# Patient Record
Sex: FEMALE | Race: WHITE | NOT HISPANIC OR LATINO
[De-identification: names, ages, dates, MRNs, and addresses within clinical notes are randomized per-mention and may not be internally consistent; named-entity substitution may affect disease eponyms.]

---

## 2017-03-23 ENCOUNTER — APPOINTMENT (OUTPATIENT)
Dept: SURGICAL ONCOLOGY | Facility: CLINIC | Age: 77
End: 2017-03-23

## 2017-05-11 ENCOUNTER — APPOINTMENT (OUTPATIENT)
Dept: SURGICAL ONCOLOGY | Facility: CLINIC | Age: 77
End: 2017-05-11

## 2017-05-11 VITALS
WEIGHT: 140 LBS | HEART RATE: 71 BPM | HEIGHT: 66 IN | BODY MASS INDEX: 22.5 KG/M2 | DIASTOLIC BLOOD PRESSURE: 98 MMHG | SYSTOLIC BLOOD PRESSURE: 195 MMHG | OXYGEN SATURATION: 98 %

## 2017-05-11 DIAGNOSIS — C43.62 MALIGNANT MELANOMA OF LEFT UPPER LIMB, INCLUDING SHOULDER: ICD-10-CM

## 2017-08-24 ENCOUNTER — OUTPATIENT (OUTPATIENT)
Dept: OUTPATIENT SERVICES | Facility: HOSPITAL | Age: 77
LOS: 1 days | Discharge: HOME | End: 2017-08-24

## 2017-08-24 DIAGNOSIS — C50.919 MALIGNANT NEOPLASM OF UNSPECIFIED SITE OF UNSPECIFIED FEMALE BREAST: ICD-10-CM

## 2017-08-30 ENCOUNTER — APPOINTMENT (OUTPATIENT)
Dept: HEMATOLOGY ONCOLOGY | Facility: CLINIC | Age: 77
End: 2017-08-30
Payer: MEDICARE

## 2017-08-30 VITALS
RESPIRATION RATE: 16 BRPM | HEART RATE: 63 BPM | SYSTOLIC BLOOD PRESSURE: 145 MMHG | WEIGHT: 142.64 LBS | TEMPERATURE: 98.6 F | OXYGEN SATURATION: 99 % | DIASTOLIC BLOOD PRESSURE: 77 MMHG | BODY MASS INDEX: 23.02 KG/M2

## 2017-08-30 PROCEDURE — 99214 OFFICE O/P EST MOD 30 MIN: CPT

## 2017-08-31 ENCOUNTER — APPOINTMENT (OUTPATIENT)
Dept: SURGICAL ONCOLOGY | Facility: CLINIC | Age: 77
End: 2017-08-31
Payer: MEDICARE

## 2017-08-31 VITALS
BODY MASS INDEX: 22.5 KG/M2 | HEART RATE: 62 BPM | WEIGHT: 140 LBS | HEIGHT: 66 IN | DIASTOLIC BLOOD PRESSURE: 66 MMHG | SYSTOLIC BLOOD PRESSURE: 110 MMHG | OXYGEN SATURATION: 98 % | TEMPERATURE: 97.7 F | RESPIRATION RATE: 16 BRPM

## 2017-08-31 DIAGNOSIS — Z85.820 PERSONAL HISTORY OF MALIGNANT MELANOMA OF SKIN: ICD-10-CM

## 2017-08-31 PROCEDURE — 99214 OFFICE O/P EST MOD 30 MIN: CPT

## 2017-12-28 ENCOUNTER — APPOINTMENT (OUTPATIENT)
Dept: SURGICAL ONCOLOGY | Facility: CLINIC | Age: 77
End: 2017-12-28

## 2019-03-12 ENCOUNTER — OUTPATIENT (OUTPATIENT)
Dept: OUTPATIENT SERVICES | Facility: HOSPITAL | Age: 79
LOS: 1 days | Discharge: HOME | End: 2019-03-12

## 2019-03-12 DIAGNOSIS — C50.919 MALIGNANT NEOPLASM OF UNSPECIFIED SITE OF UNSPECIFIED FEMALE BREAST: ICD-10-CM

## 2019-03-20 ENCOUNTER — APPOINTMENT (OUTPATIENT)
Dept: HEMATOLOGY ONCOLOGY | Facility: CLINIC | Age: 79
End: 2019-03-20
Payer: MEDICARE

## 2019-03-20 VITALS
RESPIRATION RATE: 18 BRPM | HEART RATE: 73 BPM | SYSTOLIC BLOOD PRESSURE: 158 MMHG | BODY MASS INDEX: 22.76 KG/M2 | TEMPERATURE: 97.8 F | OXYGEN SATURATION: 99 % | DIASTOLIC BLOOD PRESSURE: 87 MMHG | WEIGHT: 140.96 LBS

## 2019-03-20 PROCEDURE — 99214 OFFICE O/P EST MOD 30 MIN: CPT

## 2019-03-22 NOTE — PHYSICAL EXAM
[Restricted in physically strenuous activity but ambulatory and able to carry out work of a light or sedentary nature] : Status 1- Restricted in physically strenuous activity but ambulatory and able to carry out work of a light or sedentary nature, e.g., light house work, office work [Normal] : affect appropriate [de-identified] : right mastectomy and left breast implant, no palpable masses, tenderness in left breast

## 2019-03-22 NOTE — REVIEW OF SYSTEMS
[Joint Pain] : joint pain [Joint Stiffness] : joint stiffness [Negative] : Allergic/Immunologic [FreeTextEntry6] : saw Pulmonary and dx with asthma on inhaler with relief [FreeTextEntry8] : UTI 1 week and took antibiotic [FreeTextEntry9] : had xrays on back and neg  except arthritis

## 2019-03-22 NOTE — REASON FOR VISIT
[Follow-Up Visit] : a follow-up [Family Member] : family member [FreeTextEntry2] : ca breast x 23 years

## 2019-03-22 NOTE — ASSESSMENT
[FreeTextEntry1] : Pt to be further evaluated for left breast pain and undergo consult with Surgery and Plastic Surgery re left breast implant present for 23 yrs and post breast biopsy pain. Pt had rash after last MRI and reluctant to repeat. Mammo and US have been neg. Pt had neg biopsy. Discussed panel genetic testing . [Curative] : Goals of care discussed with patient: Curative [Palliative Care Plan] : not applicable at this time

## 2019-03-22 NOTE — HISTORY OF PRESENT ILLNESS
[de-identified] : This is a 76-year-old woman with history of breast carcinoma. The patient's history dates back to  when she was found to have a mass in the right breast. 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 Patient received chemotherapy with IV CMF and tolerated her treatment well. Since she was ER negative and she did not require hormonal therapy. The patient also underwent contralateral implant has been followed with mammograms with no evidence of recurrent disease. Of note is a family history of lymphoma or other, hematocrit breast cancer and a sister who  with pancreatic cancer. [de-identified] : since the last visit the pt remains well but had breast bx which was neg. Pt had mammo and sono and both neg. Pt has left breast pain.

## 2019-04-07 ENCOUNTER — TRANSCRIPTION ENCOUNTER (OUTPATIENT)
Age: 79
End: 2019-04-07

## 2019-04-23 ENCOUNTER — APPOINTMENT (OUTPATIENT)
Dept: SURGICAL ONCOLOGY | Facility: CLINIC | Age: 79
End: 2019-04-23
Payer: MEDICARE

## 2019-04-23 DIAGNOSIS — C50.919 MALIGNANT NEOPLASM OF UNSPECIFIED SITE OF UNSPECIFIED FEMALE BREAST: ICD-10-CM

## 2019-04-23 DIAGNOSIS — N64.4 MASTODYNIA: ICD-10-CM

## 2019-04-23 DIAGNOSIS — Z86.79 PERSONAL HISTORY OF OTHER DISEASES OF THE CIRCULATORY SYSTEM: ICD-10-CM

## 2019-04-23 DIAGNOSIS — Z87.09 PERSONAL HISTORY OF OTHER DISEASES OF THE RESPIRATORY SYSTEM: ICD-10-CM

## 2019-04-23 PROCEDURE — 99214 OFFICE O/P EST MOD 30 MIN: CPT

## 2019-04-23 PROCEDURE — 99204 OFFICE O/P NEW MOD 45 MIN: CPT

## 2019-04-23 NOTE — ASSESSMENT
[FreeTextEntry1] : Impression:\par Hx of Right breast cancer\par Left breast discomfort\par Last Breast imaging failed to identify any suspicious lesions. No suspicious lesions on exam. \par \par \par Plan:\par Undergo Left mammo/sono (pt already has script)\par Once results are obtained and if negative will refer patient to Plastics for evaluation.\par

## 2019-04-23 NOTE — CONSULT LETTER
[Dear  ___] : Dear  [unfilled], [Consult Letter:] : I had the pleasure of evaluating your patient, [unfilled]. [Please see my note below.] : Please see my note below. [Consult Closing:] : Thank you very much for allowing me to participate in the care of this patient.  If you have any questions, please do not hesitate to contact me. [Sincerely,] : Sincerely, [FreeTextEntry2] : Gonzalo Reed MD [FreeTextEntry3] : Mookie Kaiser MD \par (O) [phone]\par (F) [phone]

## 2019-04-23 NOTE — PHYSICAL EXAM
[Normal] : supple, no neck mass and thyroid not enlarged [Normal Supraclavicular Lymph Nodes] : normal supraclavicular lymph nodes [Normal Axillary Lymph Nodes] : normal axillary lymph nodes [Normal] : oriented to person, place and time, with appropriate affect [FreeTextEntry1] : RC present for exam.  [de-identified] : Normal S1, S2. Regular rate and rhythm. [de-identified] : s/p Right mastectomy with implant insitu.  Left implant insitu with Left breast appearing larger than right.  No palpable masses bilaterally.  No palpable axillary LNs. [de-identified] : Clear breath sounds bilaterally, normal respiratory effort.

## 2019-04-23 NOTE — HISTORY OF PRESENT ILLNESS
[de-identified] : Ms. Madsen is a 79 year old female who presents today for an initial consultation.  She  was referred by Dr. Gonzalo Reed.\par \par Nila has a personal history of Right breast cancer (Er/Pr-) diagnosed in  for which she underwent a Right mastectomy with reconstruction.  She also received Left breast implant for symmetry.  She received chemotherapy followed by radiation.  She  with a complaint of Left breast discomfort which she has been experiencing for approximately 10 years.  Her last Left mammogram was completed in 2018 which was without evidence of malignancy (Birads 2).  She is concerned because she received "textured" implants for her reconstruction and would like them exchanged.  She would like to rule out underlying breast disease.  Today she denies palpable Left breast masses, nipple discharge, skin changes, inversion.  She also denies any subcutaneous skin nodule in the reconstructed Right breast.\par \par PMH otherwise significant for asthma, HTN, hypothyroidism, TB (1970), melanoma (s/p excision ).\par Family history of breast cancer in her niece at 32 y.o and pancreatic cancer in her sister at 62 y.o.\par \par Menarche: 11 y.o.\par \par Age at first pregnancy:22 y.o.\par \par Med/Onc: Dr. Reed